# Patient Record
Sex: FEMALE | URBAN - METROPOLITAN AREA
[De-identification: names, ages, dates, MRNs, and addresses within clinical notes are randomized per-mention and may not be internally consistent; named-entity substitution may affect disease eponyms.]

---

## 2022-10-12 ENCOUNTER — APPOINTMENT (OUTPATIENT)
Dept: URBAN - METROPOLITAN AREA CLINIC 205 | Age: 52
Setting detail: DERMATOLOGY
End: 2022-10-12

## 2022-10-12 DIAGNOSIS — L30.9 DERMATITIS, UNSPECIFIED: ICD-10-CM

## 2022-10-12 PROCEDURE — 99202 OFFICE O/P NEW SF 15 MIN: CPT

## 2022-10-12 PROCEDURE — OTHER COUNSELING: OTHER

## 2022-10-12 PROCEDURE — OTHER TREATMENT REGIMEN: OTHER

## 2022-10-12 PROCEDURE — OTHER PRESCRIPTION: OTHER

## 2022-10-12 RX ORDER — FLUOCINOLONE ACETONIDE 0.11 MG/ML
OIL TOPICAL
Qty: 118.28 | Refills: 0 | Status: ERX | COMMUNITY
Start: 2022-10-12

## 2022-10-12 NOTE — HPI: RASH
Is This A New Presentation, Or A Follow-Up?: Rash
Additional History: Itchy scaling ears off and on for a long time.  Had recent ear infection.  Took amoxicillin course.  About a year ago used neomycin/polymyxin HC ear drops and ciprofloxacin 0.2% otic solution which did not help.

## 2022-10-12 NOTE — PROCEDURE: TREATMENT REGIMEN
Detail Level: Zone
Initiate Treatment: Fluocinolone oil 0.01 % topical body oil \\nQuantity: 118.28 ml  Days Supply: 30\\nSig: Apply to ears twice a day x 2 weeks then decrease to twice a week only\\n\\nUse vanicream shampoo, conditioner, body wash, and hair spray\\nIf no improvement plan biopsy by pilar

## 2023-04-26 ENCOUNTER — APPOINTMENT (OUTPATIENT)
Dept: URBAN - METROPOLITAN AREA CLINIC 205 | Age: 53
Setting detail: DERMATOLOGY
End: 2023-04-28

## 2023-04-26 VITALS — DIASTOLIC BLOOD PRESSURE: 80 MMHG | SYSTOLIC BLOOD PRESSURE: 124 MMHG

## 2023-04-26 DIAGNOSIS — L30.9 DERMATITIS, UNSPECIFIED: ICD-10-CM

## 2023-04-26 PROCEDURE — OTHER COUNSELING: OTHER

## 2023-04-26 PROCEDURE — 11102 TANGNTL BX SKIN SINGLE LES: CPT

## 2023-04-26 PROCEDURE — OTHER TREATMENT REGIMEN: OTHER

## 2023-04-26 PROCEDURE — OTHER BIOPSY BY SHAVE METHOD: OTHER

## 2023-04-26 ASSESSMENT — LOCATION SIMPLE DESCRIPTION DERM: LOCATION SIMPLE: SCALP

## 2023-04-26 ASSESSMENT — LOCATION DETAILED DESCRIPTION DERM: LOCATION DETAILED: RIGHT INFERIOR FRONTAL SCALP

## 2023-04-26 ASSESSMENT — LOCATION ZONE DERM: LOCATION ZONE: SCALP

## 2023-04-26 NOTE — PROCEDURE: TREATMENT REGIMEN
Plan: If pathology reveals psoriasis then consider vtama
Detail Level: Zone
Initiate Treatment: Use vanicream shampoo, conditioner, body wash, and hair styling products
Discontinue Regimen: Fluocinolone oil bc it didnt help much

## 2023-05-31 ENCOUNTER — APPOINTMENT (OUTPATIENT)
Dept: URBAN - METROPOLITAN AREA CLINIC 205 | Age: 53
Setting detail: DERMATOLOGY
End: 2023-05-31

## 2023-05-31 DIAGNOSIS — L82.1 OTHER SEBORRHEIC KERATOSIS: ICD-10-CM

## 2023-05-31 DIAGNOSIS — D18.0 HEMANGIOMA: ICD-10-CM

## 2023-05-31 DIAGNOSIS — Z71.89 OTHER SPECIFIED COUNSELING: ICD-10-CM

## 2023-05-31 DIAGNOSIS — D485 NEOPLASM OF UNCERTAIN BEHAVIOR OF SKIN: ICD-10-CM

## 2023-05-31 DIAGNOSIS — L30.9 DERMATITIS, UNSPECIFIED: ICD-10-CM

## 2023-05-31 PROBLEM — D18.01 HEMANGIOMA OF SKIN AND SUBCUTANEOUS TISSUE: Status: ACTIVE | Noted: 2023-05-31

## 2023-05-31 PROBLEM — D48.5 NEOPLASM OF UNCERTAIN BEHAVIOR OF SKIN: Status: ACTIVE | Noted: 2023-05-31

## 2023-05-31 PROCEDURE — OTHER TREATMENT REGIMEN: OTHER

## 2023-05-31 PROCEDURE — 99213 OFFICE O/P EST LOW 20 MIN: CPT

## 2023-05-31 PROCEDURE — OTHER COUNSELING: OTHER

## 2023-05-31 NOTE — PROCEDURE: TREATMENT REGIMEN
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  Encouraged vanicream products only including shampoo, conditioner, and body wash.  She hasnt switched to them yet.  She prefers to follow up prn on this issue.
Detail Level: Zone
Discontinue Regimen: Fluocinolone oil bc it didnt help much

## 2024-04-24 ENCOUNTER — OFFICE (OUTPATIENT)
Dept: URBAN - METROPOLITAN AREA CLINIC 18 | Facility: CLINIC | Age: 54
End: 2024-04-24
Payer: COMMERCIAL

## 2024-04-24 VITALS
SYSTOLIC BLOOD PRESSURE: 126 MMHG | WEIGHT: 188 LBS | HEART RATE: 81 BPM | DIASTOLIC BLOOD PRESSURE: 74 MMHG | HEIGHT: 64 IN | OXYGEN SATURATION: 98 %

## 2024-04-24 DIAGNOSIS — F43.21 ADJUSTMENT DISORDER WITH DEPRESSED MOOD: ICD-10-CM

## 2024-04-24 DIAGNOSIS — K22.70 BARRETT'S ESOPHAGUS WITHOUT DYSPLASIA: ICD-10-CM

## 2024-04-24 DIAGNOSIS — R13.10 DYSPHAGIA, UNSPECIFIED: ICD-10-CM

## 2024-04-24 PROCEDURE — 99205 OFFICE O/P NEW HI 60 MIN: CPT | Performed by: INTERNAL MEDICINE

## 2024-06-17 ENCOUNTER — APPOINTMENT (OUTPATIENT)
Dept: URBAN - METROPOLITAN AREA CLINIC 205 | Age: 54
Setting detail: DERMATOLOGY
End: 2024-06-18

## 2024-06-17 DIAGNOSIS — Z71.89 OTHER SPECIFIED COUNSELING: ICD-10-CM

## 2024-06-17 DIAGNOSIS — D18.0 HEMANGIOMA: ICD-10-CM

## 2024-06-17 DIAGNOSIS — L30.9 DERMATITIS, UNSPECIFIED: ICD-10-CM

## 2024-06-17 DIAGNOSIS — L82.1 OTHER SEBORRHEIC KERATOSIS: ICD-10-CM

## 2024-06-17 PROBLEM — D18.01 HEMANGIOMA OF SKIN AND SUBCUTANEOUS TISSUE: Status: ACTIVE | Noted: 2024-06-17

## 2024-06-17 PROCEDURE — OTHER PRESCRIPTION: OTHER

## 2024-06-17 PROCEDURE — OTHER SUNSCREEN RECOMMENDATIONS: OTHER

## 2024-06-17 PROCEDURE — OTHER TREATMENT REGIMEN: OTHER

## 2024-06-17 PROCEDURE — 99213 OFFICE O/P EST LOW 20 MIN: CPT

## 2024-06-17 PROCEDURE — OTHER COUNSELING: OTHER

## 2024-06-17 RX ORDER — TACROLIMUS 0.3 MG/G
OINTMENT TOPICAL
Qty: 30 | Refills: 0 | Status: ERX | COMMUNITY
Start: 2024-06-17

## 2024-06-17 NOTE — PROCEDURE: TREATMENT REGIMEN
Continue Regimen: Fluocinolone oil use once a week or prn to affected areas
Plan: She says months ago she developed cellulitis at this site.  She said she had to be hospitalized and given antibiotics.  The chronic inflammation from the contact dermatitis may have made her more susceptible to infection.  This infection is of course resolved at this time.  \\nPathology from 4/26/2023 revealed spongiotic dermatitis.  I still favor contact dermatitis currently. She says she has been dealing with this since she was a child.  She might have eczema too but I think contact dermatitis is the biggest issue right now.  It seems there has been miscommunication in the past.  She says last year she understood me to say there was nothing that can be done.  That is inaccurate.  I only followed up with her prn last year because that is what she wanted.  I explained everything to her again.  Since I favor allergic contact dermatitis we need to determine what might be causing the allergic reaction.  She grew up with a  as a mother and has been using hair spray etc for most of her life.  Her hair products are likely contributing to the issue.   I again strongly encouraged her to switch to vanicream products only including shampoo, conditioner, hair spray, and body wash. I have recommended this the past 2 visits as well and she hasnt switched to them yet.  I told her she will need to use them for 6 weeks at least to see if there is resolution.  She said she will switch to them this time.  She agreed to tacrolimus and follow up in 6 weeks as well.  She asked if she can return to using her normal products if the dermatitis resolves.  I told her if the dermatitis resolves with these products it was likely her former products causing the allergy so she will never be able to use them again.  I then compared it to poison ivy.  If poison ivy resolves you will still get a rash if you come in contact with poison ivy again. She voiced understanding.  Everything was provided in writing as well.
Detail Level: Zone
Initiate Treatment: tacrolimus 0.03 % topical ointment Apply to affected areas two times a day x 6 weeks then take two weeks off and repeat if needed
Discontinue Regimen: Coconut oil
Samples Given: Vanicream shampoo, conditioner, hair spray, body wash & body cream twice a day